# Patient Record
Sex: MALE | Race: WHITE | Employment: FULL TIME | URBAN - METROPOLITAN AREA
[De-identification: names, ages, dates, MRNs, and addresses within clinical notes are randomized per-mention and may not be internally consistent; named-entity substitution may affect disease eponyms.]

---

## 2022-02-07 ENCOUNTER — HOSPITAL ENCOUNTER (EMERGENCY)
Age: 37
Discharge: HOME | End: 2022-02-07
Attending: EMERGENCY MEDICINE
Payer: COMMERCIAL

## 2022-02-07 ENCOUNTER — APPOINTMENT (OUTPATIENT)
Dept: GENERAL RADIOLOGY | Age: 37
End: 2022-02-07
Payer: COMMERCIAL

## 2022-02-07 VITALS
RESPIRATION RATE: 17 BRPM | HEART RATE: 99 BPM | HEIGHT: 70 IN | OXYGEN SATURATION: 97 % | DIASTOLIC BLOOD PRESSURE: 94 MMHG | SYSTOLIC BLOOD PRESSURE: 147 MMHG | TEMPERATURE: 97.8 F | WEIGHT: 165.79 LBS | BODY MASS INDEX: 23.73 KG/M2

## 2022-02-07 DIAGNOSIS — S61.213A LACERATION OF LEFT MIDDLE FINGER WITHOUT FOREIGN BODY WITHOUT DAMAGE TO NAIL, INITIAL ENCOUNTER: Primary | ICD-10-CM

## 2022-02-07 PROCEDURE — 90471 IMMUNIZATION ADMIN: CPT | Performed by: EMERGENCY MEDICINE

## 2022-02-07 PROCEDURE — 73140 X-RAY EXAM OF FINGER(S): CPT

## 2022-02-07 PROCEDURE — 6360000002 HC RX W HCPCS: Performed by: EMERGENCY MEDICINE

## 2022-02-07 PROCEDURE — 64450 NJX AA&/STRD OTHER PN/BRANCH: CPT

## 2022-02-07 PROCEDURE — 90715 TDAP VACCINE 7 YRS/> IM: CPT | Performed by: EMERGENCY MEDICINE

## 2022-02-07 PROCEDURE — 99284 EMERGENCY DEPT VISIT MOD MDM: CPT

## 2022-02-07 RX ADMIN — TETANUS TOXOID, REDUCED DIPHTHERIA TOXOID AND ACELLULAR PERTUSSIS VACCINE, ADSORBED 0.5 ML: 5; 2.5; 8; 8; 2.5 SUSPENSION INTRAMUSCULAR at 11:51

## 2022-02-07 ASSESSMENT — PAIN - FUNCTIONAL ASSESSMENT: PAIN_FUNCTIONAL_ASSESSMENT: PREVENTS OR INTERFERES SOME ACTIVE ACTIVITIES AND ADLS

## 2022-02-07 ASSESSMENT — PAIN DESCRIPTION - DESCRIPTORS: DESCRIPTORS: PRESSURE;ACHING

## 2022-02-07 ASSESSMENT — PAIN DESCRIPTION - ORIENTATION: ORIENTATION: LEFT

## 2022-02-07 ASSESSMENT — PAIN DESCRIPTION - FREQUENCY: FREQUENCY: CONTINUOUS

## 2022-02-07 ASSESSMENT — PAIN DESCRIPTION - LOCATION: LOCATION: HAND

## 2022-02-07 ASSESSMENT — PAIN SCALES - GENERAL
PAINLEVEL_OUTOF10: 0
PAINLEVEL_OUTOF10: 4
PAINLEVEL_OUTOF10: 0

## 2022-02-07 ASSESSMENT — PAIN DESCRIPTION - PAIN TYPE: TYPE: ACUTE PAIN

## 2022-02-07 ASSESSMENT — PAIN DESCRIPTION - PROGRESSION: CLINICAL_PROGRESSION: GRADUALLY IMPROVING

## 2022-02-07 NOTE — ED NOTES
Soaking finger in basin with sterile water and hibiclense. Tolerating it well after Dr. Swapnil Gray injected lidocaine.      Chata Fraga RN  02/07/22 [id number]

## 2022-02-07 NOTE — ED TRIAGE NOTES
Smashed his 3rd digit of his left hand while moving a heavy table. Incident occurred about 0930 this morning. Bruising, swelling and impaired skin integrity of the 3rd digit. Limited mobility due to the pain.

## 2022-02-07 NOTE — ED PROVIDER NOTES
[facility]  eMERGENCY dEPARTMENT eNCOUnter      Pt Name: Estelle Goodpasture  MRN: [id number]  [place] 1985  Date of evaluation: 2/7/2022  Provider: Ashley Rodriguez MD    [address] [place] [address]       Chief Complaint   Patient presents with    Laceration     Smashed his 3rd digit of his left hand while moving a heavy table. Incident occurred about 0930 this morning. HISTORY OF PRESENT ILLNESS  (Location/Symptom, Timing/Onset, Context/Setting, Quality, Duration, Modifying Factors, Severity.)   Estelle Goodpasture is a 43 y.o. male who presents to the emergency department complaint of an injury to his left middle finger that occurred around 9 AM this morning. States he was moving a heavy table 1-just finger between the table. His injury is to his left third finger only. He does have a laceration. He has some pain in the finger. Is a flexion deformity of his second digit of his left hand which is old. He has no numbness or tingling. He does not know when he last had a tetanus immunization. Nursing Notes were reviewed and I agree. REVIEW OF SYSTEMS    (2-9 systems for level 4, 10 or more for level 5)     Musculoskeletal: Left third finger injury with pain and mild swelling. Skin: Laceration on the bottom of the left third finger. Neuro: No numbness tingling or paresthesias to the left upper extremity. Except as noted above the remainder of the review of systems was reviewed and negative. PAST MEDICAL HISTORY         Diagnosis Date    Hyperlipidemia     Hypertension        SURGICAL HISTORY           Procedure Laterality Date    APPENDECTOMY         CURRENT MEDICATIONS       Previous Medications    AMLODIPINE BESYLATE (NORVASC PO)    Take by mouth       ALLERGIES     Patient has no known allergies. FAMILY HISTORY     History reviewed. No pertinent family history. No family status information on file.         SOCIAL HISTORY      reports that he has been smoking cigarettes. He has never used smokeless tobacco. He reports current alcohol use. He reports that he does not use drugs. PHYSICAL EXAM    (up to 7 for level 4, 8 or more for level 5)     ED Triage Vitals [02/07/22 1128]   BP Temp Temp Source Pulse Resp SpO2 Height Weight   (!) 147/94 97.8 °F (36.6 °C) Tympanic 99 17 97 % 5' 10\" (1.778 m) 165 lb 12.6 oz (75.2 kg)       General: Alert white male no acute distress. Musculoskeletal: Left wrist is nontender without swelling. Full range of motion without pain. Has some mild swelling of the middle and distal phalanx of the left third digit. He has tenderness of the middle and distal phalanx of the left third digit. He has a flexion deformity at the PIP and DIP of the left second digit. Is intact range of motion of all other 4 digits including active and passive extension and flexion both superficial and deep flexor tendon function. He has no other deformity other than the flexion deformity of the second digit. Skin: Superficial skin avulsion type laceration over the volar middle phalanx of the third digit. The superficial skin flap covers about half of the volar surface of the middle phalanx and about 30% of the volar surface of the distal phalanx of the third digit. Neuro: Intact motor function sensation left upper extremity. DIAGNOSTIC RESULTS     RADIOLOGY:   Non-plain film images such as CT, Ultrasound and MRI are read by the radiologist. Plain radiographic images are visualized and preliminarily interpreted by Ana Paula Quiñones MD with the below findings:      Interpretation per the Radiologist below, if available at the time of this note:    XR FINGER LEFT (MIN 2 VIEWS)   Final Result   No fracture or malalignment seen involving the 3rd digit. LABS:  Labs Reviewed - No data to display    All other labs were within normal range or not returned as of this dictation.     EMERGENCY DEPARTMENT COURSE and DIFFERENTIAL DIAGNOSIS/MDM:   Vitals:    Vitals:    02/07/22 1128   BP: (!) 147/94   Pulse: 99   Resp: 17   Temp: 97.8 °F (36.6 °C)   TempSrc: Tympanic   SpO2: 97%   Weight: 165 lb 12.6 oz (75.2 kg)   Height: 5' 10\" (1.778 m)       This patient has a crush injury to his left middle finger. After he was x-rayed, cleaned and fully evaluated the laceration on the volar aspect of his middle and distal phalanx was a superficial skin avulsion which was debrided. A dressing was placed. Recommended follow-up in 5 to 7 days for wound recheck. Return here at anytime for signs of infection or any other symptoms of concern. His tetanus was updated    PROCEDURES:  Digital block 1% lidocaine plain. Incremental injection. Negative aspiration for blood. Good anesthesia. Prepped with Hibiclens and saline. Superficial skin avulsion on the volar middle and distal phalanx was debrided with iris scissors. There were no areas of full skin thickness laceration. No suturing was required. A nonadherent dressing was placed over the wound. Patient tolerated procedure well without complications    FINAL IMPRESSION      1.  Laceration of left middle finger without foreign body without damage to nail, initial encounter          DISPOSITION/PLAN   DISPOSITION Decision To Discharge 02/07/2022 12:16:06 PM      PATIENT REFERRED TO:  [facility]) Pre-Services  [phone]          DISCHARGE MEDICATIONS:  New Prescriptions    No medications on file       (Please note that portions of this note were completed with a voice recognition program.  Efforts were made to edit the dictations but occasionally words are mis-transcribed.)    Cecil Mcqueen MD  Attending Emergency Physician        Kiesha Granados MD  02/07/22 [id number]